# Patient Record
Sex: MALE
[De-identification: names, ages, dates, MRNs, and addresses within clinical notes are randomized per-mention and may not be internally consistent; named-entity substitution may affect disease eponyms.]

---
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
## 2019-05-25 ENCOUNTER — APPOINTMENT (OUTPATIENT)
Dept: GENERAL RADIOLOGY | Facility: CLINIC | Age: 19
End: 2019-05-25
Attending: EMERGENCY MEDICINE
Payer: MEDICAID

## 2019-05-25 ENCOUNTER — HOSPITAL ENCOUNTER (EMERGENCY)
Facility: CLINIC | Age: 19
Discharge: HOME | End: 2019-05-25
Attending: EMERGENCY MEDICINE | Admitting: EMERGENCY MEDICINE
Payer: MEDICAID

## 2019-05-25 ENCOUNTER — APPOINTMENT (OUTPATIENT)
Dept: CT IMAGING | Facility: CLINIC | Age: 19
End: 2019-05-25
Attending: EMERGENCY MEDICINE
Payer: MEDICAID

## 2019-05-25 VITALS
WEIGHT: 288 LBS | HEIGHT: 72 IN | BODY MASS INDEX: 39.01 KG/M2 | OXYGEN SATURATION: 95 % | HEART RATE: 86 BPM | DIASTOLIC BLOOD PRESSURE: 68 MMHG | SYSTOLIC BLOOD PRESSURE: 130 MMHG | RESPIRATION RATE: 18 BRPM | TEMPERATURE: 98.5 F

## 2019-05-25 DIAGNOSIS — R55 VASOVAGAL EPISODE: ICD-10-CM

## 2019-05-25 DIAGNOSIS — J11.1 INFLUENZA-LIKE ILLNESS: ICD-10-CM

## 2019-05-25 LAB
ALBUMIN SERPL-MCNC: 4.2 G/DL (ref 3.4–5)
ALBUMIN UR-MCNC: NEGATIVE MG/DL
ALP SERPL-CCNC: 79 U/L (ref 65–260)
ALT SERPL W P-5'-P-CCNC: 21 U/L (ref 0–50)
ANION GAP SERPL CALCULATED.3IONS-SCNC: 5 MMOL/L (ref 3–14)
APPEARANCE UR: CLEAR
AST SERPL W P-5'-P-CCNC: 12 U/L (ref 0–35)
BASOPHILS # BLD AUTO: 0 10E9/L (ref 0–0.2)
BASOPHILS NFR BLD AUTO: 0.3 %
BILIRUB DIRECT SERPL-MCNC: 0.3 MG/DL (ref 0–0.2)
BILIRUB SERPL-MCNC: 1.1 MG/DL (ref 0.2–1.3)
BILIRUB UR QL STRIP: NEGATIVE
BUN SERPL-MCNC: 7 MG/DL (ref 7–30)
CALCIUM SERPL-MCNC: 8.7 MG/DL (ref 8.5–10.1)
CHLORIDE SERPL-SCNC: 102 MMOL/L (ref 98–110)
CK SERPL-CCNC: 154 U/L (ref 30–300)
CO2 SERPL-SCNC: 27 MMOL/L (ref 20–32)
COLOR UR AUTO: ABNORMAL
CREAT SERPL-MCNC: 0.7 MG/DL (ref 0.5–1)
DEPRECATED S PYO AG THROAT QL EIA: NORMAL
DIFFERENTIAL METHOD BLD: ABNORMAL
EOSINOPHIL # BLD AUTO: 0 10E9/L (ref 0–0.7)
EOSINOPHIL NFR BLD AUTO: 0 %
ERYTHROCYTE [DISTWIDTH] IN BLOOD BY AUTOMATED COUNT: 13.3 % (ref 10–15)
FLUAV+FLUBV RNA SPEC QL NAA+PROBE: NEGATIVE
FLUAV+FLUBV RNA SPEC QL NAA+PROBE: NEGATIVE
GFR SERPL CREATININE-BSD FRML MDRD: >90 ML/MIN/{1.73_M2}
GLUCOSE BLDC GLUCOMTR-MCNC: 121 MG/DL (ref 70–99)
GLUCOSE SERPL-MCNC: 118 MG/DL (ref 70–99)
GLUCOSE UR STRIP-MCNC: NEGATIVE MG/DL
HCT VFR BLD AUTO: 42 % (ref 40–53)
HGB BLD-MCNC: 12.8 G/DL (ref 13.3–17.7)
HGB UR QL STRIP: ABNORMAL
IMM GRANULOCYTES # BLD: 0 10E9/L (ref 0–0.4)
IMM GRANULOCYTES NFR BLD: 0.2 %
KETONES UR STRIP-MCNC: 20 MG/DL
LEUKOCYTE ESTERASE UR QL STRIP: NEGATIVE
LYMPHOCYTES # BLD AUTO: 0.7 10E9/L (ref 0.8–5.3)
LYMPHOCYTES NFR BLD AUTO: 5.5 %
MCH RBC QN AUTO: 22.2 PG (ref 26.5–33)
MCHC RBC AUTO-ENTMCNC: 30.5 G/DL (ref 31.5–36.5)
MCV RBC AUTO: 73 FL (ref 78–100)
MONOCYTES # BLD AUTO: 1.2 10E9/L (ref 0–1.3)
MONOCYTES NFR BLD AUTO: 9.5 %
NEUTROPHILS # BLD AUTO: 10.9 10E9/L (ref 1.6–8.3)
NEUTROPHILS NFR BLD AUTO: 84.5 %
NITRATE UR QL: NEGATIVE
NRBC # BLD AUTO: 0 10*3/UL
NRBC BLD AUTO-RTO: 0 /100
PH UR STRIP: 6.5 PH (ref 5–7)
PLATELET # BLD AUTO: 377 10E9/L (ref 150–450)
POTASSIUM SERPL-SCNC: 3.2 MMOL/L (ref 3.4–5.3)
PROT SERPL-MCNC: 8.1 G/DL (ref 6.8–8.8)
RBC # BLD AUTO: 5.76 10E12/L (ref 4.4–5.9)
RBC #/AREA URNS AUTO: 5 /HPF (ref 0–2)
RSV RNA SPEC NAA+PROBE: NEGATIVE
SODIUM SERPL-SCNC: 134 MMOL/L (ref 133–144)
SOURCE: ABNORMAL
SP GR UR STRIP: 1.01 (ref 1–1.03)
SPECIMEN SOURCE: NORMAL
SPECIMEN SOURCE: NORMAL
UROBILINOGEN UR STRIP-MCNC: NORMAL MG/DL (ref 0–2)
WBC # BLD AUTO: 13 10E9/L (ref 4–11)
WBC #/AREA URNS AUTO: 2 /HPF (ref 0–5)

## 2019-05-25 PROCEDURE — 70450 CT HEAD/BRAIN W/O DYE: CPT

## 2019-05-25 PROCEDURE — 71046 X-RAY EXAM CHEST 2 VIEWS: CPT

## 2019-05-25 PROCEDURE — 25000125 ZZHC RX 250

## 2019-05-25 PROCEDURE — 80048 BASIC METABOLIC PNL TOTAL CA: CPT | Performed by: EMERGENCY MEDICINE

## 2019-05-25 PROCEDURE — 94640 AIRWAY INHALATION TREATMENT: CPT

## 2019-05-25 PROCEDURE — 96361 HYDRATE IV INFUSION ADD-ON: CPT

## 2019-05-25 PROCEDURE — 85025 COMPLETE CBC W/AUTO DIFF WBC: CPT | Performed by: EMERGENCY MEDICINE

## 2019-05-25 PROCEDURE — 87081 CULTURE SCREEN ONLY: CPT | Performed by: EMERGENCY MEDICINE

## 2019-05-25 PROCEDURE — 87631 RESP VIRUS 3-5 TARGETS: CPT | Performed by: EMERGENCY MEDICINE

## 2019-05-25 PROCEDURE — 93005 ELECTROCARDIOGRAM TRACING: CPT

## 2019-05-25 PROCEDURE — 96360 HYDRATION IV INFUSION INIT: CPT

## 2019-05-25 PROCEDURE — 80076 HEPATIC FUNCTION PANEL: CPT | Performed by: EMERGENCY MEDICINE

## 2019-05-25 PROCEDURE — 82550 ASSAY OF CK (CPK): CPT | Performed by: EMERGENCY MEDICINE

## 2019-05-25 PROCEDURE — 87804 INFLUENZA ASSAY W/OPTIC: CPT | Performed by: EMERGENCY MEDICINE

## 2019-05-25 PROCEDURE — 87880 STREP A ASSAY W/OPTIC: CPT | Performed by: EMERGENCY MEDICINE

## 2019-05-25 PROCEDURE — 99285 EMERGENCY DEPT VISIT HI MDM: CPT | Mod: 25

## 2019-05-25 PROCEDURE — 25000132 ZZH RX MED GY IP 250 OP 250 PS 637: Performed by: EMERGENCY MEDICINE

## 2019-05-25 PROCEDURE — 00000146 ZZHCL STATISTIC GLUCOSE BY METER IP

## 2019-05-25 PROCEDURE — 81001 URINALYSIS AUTO W/SCOPE: CPT | Performed by: EMERGENCY MEDICINE

## 2019-05-25 PROCEDURE — 25000128 H RX IP 250 OP 636: Performed by: EMERGENCY MEDICINE

## 2019-05-25 RX ORDER — IPRATROPIUM BROMIDE AND ALBUTEROL SULFATE 2.5; .5 MG/3ML; MG/3ML
SOLUTION RESPIRATORY (INHALATION)
Status: COMPLETED
Start: 2019-05-25 | End: 2019-05-25

## 2019-05-25 RX ORDER — IPRATROPIUM BROMIDE AND ALBUTEROL SULFATE 2.5; .5 MG/3ML; MG/3ML
3 SOLUTION RESPIRATORY (INHALATION) ONCE
Status: COMPLETED | OUTPATIENT
Start: 2019-05-25 | End: 2019-05-25

## 2019-05-25 RX ORDER — IBUPROFEN 600 MG/1
600 TABLET, FILM COATED ORAL ONCE
Status: COMPLETED | OUTPATIENT
Start: 2019-05-25 | End: 2019-05-25

## 2019-05-25 RX ORDER — IPRATROPIUM BROMIDE AND ALBUTEROL SULFATE 2.5; .5 MG/3ML; MG/3ML
3 SOLUTION RESPIRATORY (INHALATION) ONCE
Status: DISCONTINUED | OUTPATIENT
Start: 2019-05-25 | End: 2019-05-25

## 2019-05-25 RX ADMIN — IPRATROPIUM BROMIDE AND ALBUTEROL SULFATE 3 ML: .5; 3 SOLUTION RESPIRATORY (INHALATION) at 19:56

## 2019-05-25 RX ADMIN — SODIUM CHLORIDE 1000 ML: 9 INJECTION, SOLUTION INTRAVENOUS at 19:27

## 2019-05-25 RX ADMIN — IPRATROPIUM BROMIDE AND ALBUTEROL SULFATE 3 ML: 2.5; .5 SOLUTION RESPIRATORY (INHALATION) at 19:56

## 2019-05-25 RX ADMIN — IBUPROFEN 600 MG: 600 TABLET ORAL at 19:39

## 2019-05-25 ASSESSMENT — ENCOUNTER SYMPTOMS
ABDOMINAL PAIN: 1
WEAKNESS: 1
DIAPHORESIS: 1
NAUSEA: 1
CHILLS: 1
HEADACHES: 1
COUGH: 0

## 2019-05-25 ASSESSMENT — MIFFLIN-ST. JEOR: SCORE: 2359.36

## 2019-05-25 NOTE — ED NOTES
"Patient reports feeling unwell the past 2 days. States he only ate watermelon today and was feeling episodes of hot and cold. Reports chills at this time. Denies checking temperature at home. Patient was at [facility] and noted to have \"a lot of sweating\", feeling unwell, and dizzy. Patient unsure of exactly what happened at the [facility]. States he is unsure if he passed out our not. He states he was feeling unwell in the car and he closed his eyes. Patient has been taking Diatomaceous earth daily. Denies taking today.     "

## 2019-05-25 NOTE — ED PROVIDER NOTES
"  History     Chief Complaint:  Loss of Consciousness      HPI   Hakeem Huizar is a 18 year old male with history of concussions (x10) and migraines who presents to the emergency department today for evaluation of loss of consciousness. The patient reports that he is on a water fast for the past 3 days for detox, stopping marijuana and alcohol. He developed a \"banging\" headache. The patient tried to resolve the pain with benadryl and tylenol that gave no relief. He then notes that while he was at the [employer] shop he developed diaphoresis, body aches, abdominal pain, chills, and eventually had an episode of syncope. The patient also endorses nausea, leg weakness, left ear pain, and feels dehydrated. He denies any cough, congestion, or history of fainting spells.     Allergies:  Augmentin    Medications:    Tylenol   Benadryl    Past Medical History:    Mental health problem  Asthma  ADHD  Depression    Past Surgical History:    Appendectomy  Orthopedic surgery    Family History:    Father: Asthma  Sister: Asthma, depression  Mother: Anxiety disorder    Social History:  The patient was accompanied to the ED by .  Smoking Status: Never Smoker  Smokeless Tobacco: Never Used  Alcohol Use: Negative    Past 3 days  Drug Use: Negative   Marijuana: Past 3 days  PCP: Naif Lnidsay   Marital Status:  Single      Review of Systems   Constitutional: Positive for chills and diaphoresis.   HENT: Positive for ear pain. Negative for congestion.    Respiratory: Negative for cough.    Gastrointestinal: Positive for abdominal pain and nausea.   Musculoskeletal:        Positive for body aches   Neurological: Positive for syncope, weakness and headaches.   All other systems reviewed and are negative.      Physical Exam     Patient Vitals for the past 24 hrs:   BP Temp Temp src Pulse Heart Rate Resp SpO2 Height Weight   05/25/19 2258 130/68 -- -- 86 -- 18 95 % -- --   05/25/19 2249 -- 98.5  F (36.9  C) Oral -- -- -- -- -- -- "   05/25/19 2037 -- 99.9  F (37.7  C) Oral -- -- -- -- -- --   05/25/19 2022 -- -- -- -- -- -- 100 % -- --   05/25/19 2000 -- -- -- -- -- -- 100 % -- --   05/25/19 1927 -- 101.3  F (38.5  C) Oral -- -- -- -- -- --   05/25/19 1631 (!) 137/91 98.4  F (36.9  C) Temporal -- 98 20 95 % 1.829 m (6') 130.6 kg (288 lb)        Physical Exam    Vital signs and nursing notes reviewed.     Constitutional: laying on gurney appears mildly uncomfortable  HENT: Oropharynx is clear and moist, no tonsillar enlargement or exudates  Eyes: Conjunctivae are normal bilaterally. Pupils equal  Neck: normal range of motion, no meningeal signs  Cardiovascular: Normal rate, regular rhythm, normal heart sounds.   Pulmonary/Chest: Effort normal and breath sounds normal. No respiratory distress.   Abdominal: Soft. Bowel sounds are normal. No tenderness to palpation. No rebound or guarding.   Musculoskeletal: No joint swelling or edema. No muscle tightness or swelling  Neurological: Alert and oriented. No focal weakness  Skin: Skin is warm and dry. No rash noted.   Psych: normal affect    Emergency Department Course     ECG:  ECG taken at 1729, ECG read at 1732  Normal sinus rhythm  Nonspecific T wave abnormality  Abnormal ECG  Rate 90 bpm. MN interval 168 ms. QRS duration 106 ms. QT/QTc 46/423 ms. P-R-T axes 66 72 31.    Imaging:  Radiology findings were communicated with the patient who voiced understanding of the findings.    Chest XR,  PA & LAT  No acute cardiopulmonary abnormalities.    BASIL PHILLIPS MD  Reading per radiology    CT Head w/o Contrast  Normal CT scan of the head.  BASIL PHILLIPS MD  Reading per radiology    Laboratory:  Laboratory findings were communicated with the patient who voiced understanding of the findings.    UA with microscopic: Urineketon 20, blood trace, RBC/HPF 5 (H) o/w WNL  Influenza A and B and RSV PCR: negative   Beta strep group A culture: In process  Rapid strep screen (specimen throat): Negative   Glucose by  meter: 121 (H)  CBC: WBC 13.0 (H), HGB 12.8 (L),   BMP: Potassium 3.2 (L), Glucose 118 (H) o/w WNL (Creatinine 0.70)  Hepatic panel: Bilirubin 0.3 (H)  CK total: 154     Interventions:  1927 NS 1000 ml IV  1939 Ibuprofen 600 mg PO  1956 Duoneb 3 ml Nebuliation    Emergency Department Course:    1640 Nursing notes and vitals reviewed.    1641 I performed an exam of the patient as documented above.     1702 IV was inserted and blood was drawn for laboratory testing, results above.     1729 EKG obtained as noted above.     1750 The patient was sent for a CT head w/o contrast while in the emergency department, results above.      2026 The patient was sent for a chest imaging while in the emergency department, results above.      2044 The patient provided a urine sample here in the emergency department. This was sent for laboratory testing, findings above.     2236 Patient rechecked and updated.      2303 I personally reviewed the EKG, laboratory, and Imaging results with the patient and answered all related questions prior to discharge.    Impression & Plan      Medical Decision Making:  Hakeem Huizar is a 18 year old male who presents after stating he felt tired, body aches, mild headache and what sounds like a problem of  vasovagal type episode at the [employer] today. At initial arrival patient felt chill, achy all over. He had normal vital signs in initial presentation and no concerning physical findings. I did complete a CT scan since the patient notes a mild headache and his syncopal episode, these were negative. His EKG showed no acute findings. Lab test did show leukocytosis and patient was noted to spike a fever here in the ER. There were additional test were obtained. Chest X-Ray were showed no evidence of pneumonia, uranalysis showed no evidence of UTI. Strep and influenza screens were negative. After medications for his fever and IV fluids, patient said he felt significantly better, he had no no  longer had a headache or no neck stiffness or pain to suggest meningitis. His symptoms that he describes, with the myalgias, or chills, and fatigue it seems consistent with viral influenza-like illness. I discussed I cannot rule out other potential bacterial illness, but I discussed with him I suspect highly unlikely. I discussed about lumbar puncture for further testing though there is no clear evidence for meningitis either. Patient understands this and does not want to proceed with lumbar puncture and is okay with not getting one at this time. I discussed at length about his findings and discussed reasons for return like uncontrolled fever, confusions, severe headache, or unusual rash, chest pain, shortness of breath, and if these occur he should return immediately otherwise follow up with PCP. Patient understands the plan, no questions, and is discharged home.     Diagnosis:    ICD-10-CM   1. Influenza-like illness R69   2. Vasovagal episode R55     Disposition:   The patient is discharged to home.     Scribe Disclosure:  I, Dago Jeffery, am serving as a scribe at 4:50 PM on 5/25/2019 to document services personally performed by Eduardo Lombardo MD based on my observations and the provider's statements to me.     [facility] EMERGENCY DEPARTMENT       Eduardo Lombardo MD  05/27/19 9381

## 2019-05-25 NOTE — ED TRIAGE NOTES
"I was sitting in a chair and the [employer] told my friend to take me to the hospital.  Feeling dizzy, chills, sleepy and states he \"blacked out at the [employer] shop\"  Complains of headache, chills.  States he has been detoxing with salads and water.   "

## 2019-05-25 NOTE — ED AVS SNAPSHOT
[facility] Emergency Department  [address]  [facility] [address]  Phone:  [phone]  Fax:  [phone]                                    Hakeem Huizar   MRN: [id number]    Department:  [facility] Emergency Department   Date of Visit:  5/25/2019           After Visit Summary Signature Page    I have received my discharge instructions, and my questions have been answered. I have discussed any challenges I see with this plan with the nurse or doctor.    ..........................................................................................................................................  Patient/Patient Representative Signature      ..........................................................................................................................................  Patient Representative Print Name and Relationship to Patient    ..................................................               ................................................  Date                                   Time    ..........................................................................................................................................  Reviewed by Signature/Title    ...................................................              ..............................................  Date                                               Time          22EPIC Rev 08/18

## 2019-05-26 ENCOUNTER — APPOINTMENT (OUTPATIENT)
Dept: GENERAL RADIOLOGY | Facility: CLINIC | Age: 19
End: 2019-05-26
Attending: EMERGENCY MEDICINE
Payer: MEDICAID

## 2019-05-26 ENCOUNTER — HOSPITAL ENCOUNTER (EMERGENCY)
Facility: CLINIC | Age: 19
Discharge: HOME | End: 2019-05-26
Attending: EMERGENCY MEDICINE | Admitting: EMERGENCY MEDICINE
Payer: MEDICAID

## 2019-05-26 VITALS
TEMPERATURE: 102.8 F | BODY MASS INDEX: 39.33 KG/M2 | SYSTOLIC BLOOD PRESSURE: 177 MMHG | DIASTOLIC BLOOD PRESSURE: 87 MMHG | WEIGHT: 290 LBS | HEART RATE: 97 BPM | OXYGEN SATURATION: 99 % | RESPIRATION RATE: 16 BRPM

## 2019-05-26 DIAGNOSIS — K59.00 CONSTIPATION, UNSPECIFIED CONSTIPATION TYPE: ICD-10-CM

## 2019-05-26 DIAGNOSIS — B34.9 VIRAL ILLNESS: ICD-10-CM

## 2019-05-26 DIAGNOSIS — R53.83 TIREDNESS: ICD-10-CM

## 2019-05-26 LAB
INTERPRETATION ECG - MUSE: NORMAL
TSH SERPL DL<=0.005 MIU/L-ACNC: 1.21 MU/L (ref 0.4–4)

## 2019-05-26 PROCEDURE — 25000132 ZZH RX MED GY IP 250 OP 250 PS 637: Performed by: EMERGENCY MEDICINE

## 2019-05-26 PROCEDURE — 74019 RADEX ABDOMEN 2 VIEWS: CPT

## 2019-05-26 PROCEDURE — 84443 ASSAY THYROID STIM HORMONE: CPT | Performed by: EMERGENCY MEDICINE

## 2019-05-26 PROCEDURE — 25000125 ZZHC RX 250: Performed by: EMERGENCY MEDICINE

## 2019-05-26 PROCEDURE — 99285 EMERGENCY DEPT VISIT HI MDM: CPT | Mod: 25

## 2019-05-26 PROCEDURE — 25000128 H RX IP 250 OP 636: Performed by: EMERGENCY MEDICINE

## 2019-05-26 PROCEDURE — 94640 AIRWAY INHALATION TREATMENT: CPT

## 2019-05-26 PROCEDURE — 96374 THER/PROPH/DIAG INJ IV PUSH: CPT

## 2019-05-26 PROCEDURE — 96361 HYDRATE IV INFUSION ADD-ON: CPT

## 2019-05-26 PROCEDURE — 93005 ELECTROCARDIOGRAM TRACING: CPT

## 2019-05-26 RX ORDER — IBUPROFEN 600 MG/1
600 TABLET, FILM COATED ORAL ONCE
Status: COMPLETED | OUTPATIENT
Start: 2019-05-26 | End: 2019-05-26

## 2019-05-26 RX ORDER — SENNA AND DOCUSATE SODIUM 50; 8.6 MG/1; MG/1
1 TABLET, FILM COATED ORAL AT BEDTIME
Qty: 10 TABLET | Refills: 0 | Status: SHIPPED | OUTPATIENT
Start: 2019-05-26

## 2019-05-26 RX ORDER — ACETAMINOPHEN 325 MG/1
975 TABLET ORAL ONCE
Status: COMPLETED | OUTPATIENT
Start: 2019-05-26 | End: 2019-05-26

## 2019-05-26 RX ORDER — IPRATROPIUM BROMIDE AND ALBUTEROL SULFATE 2.5; .5 MG/3ML; MG/3ML
3 SOLUTION RESPIRATORY (INHALATION)
Status: DISPENSED | OUTPATIENT
Start: 2019-05-26 | End: 2019-05-26

## 2019-05-26 RX ORDER — ONDANSETRON 2 MG/ML
4 INJECTION INTRAMUSCULAR; INTRAVENOUS ONCE
Status: COMPLETED | OUTPATIENT
Start: 2019-05-26 | End: 2019-05-26

## 2019-05-26 RX ORDER — POLYETHYLENE GLYCOL 3350 17 G/17G
1 POWDER, FOR SOLUTION ORAL DAILY
Qty: 527 G | Refills: 0 | Status: SHIPPED | OUTPATIENT
Start: 2019-05-26 | End: 2019-06-25

## 2019-05-26 RX ORDER — ONDANSETRON 4 MG/1
4 TABLET, ORALLY DISINTEGRATING ORAL EVERY 8 HOURS PRN
Qty: 10 TABLET | Refills: 0 | Status: SHIPPED | OUTPATIENT
Start: 2019-05-26 | End: 2019-05-29

## 2019-05-26 RX ADMIN — IPRATROPIUM BROMIDE AND ALBUTEROL SULFATE 3 ML: .5; 3 SOLUTION RESPIRATORY (INHALATION) at 20:17

## 2019-05-26 RX ADMIN — IBUPROFEN 600 MG: 600 TABLET ORAL at 23:10

## 2019-05-26 RX ADMIN — ACETAMINOPHEN 975 MG: 325 TABLET, FILM COATED ORAL at 21:33

## 2019-05-26 RX ADMIN — SODIUM CHLORIDE 1000 ML: 9 INJECTION, SOLUTION INTRAVENOUS at 20:17

## 2019-05-26 RX ADMIN — ONDANSETRON 4 MG: 2 INJECTION INTRAMUSCULAR; INTRAVENOUS at 22:52

## 2019-05-26 RX ADMIN — IPRATROPIUM BROMIDE AND ALBUTEROL SULFATE 3 ML: .5; 3 SOLUTION RESPIRATORY (INHALATION) at 21:37

## 2019-05-26 ASSESSMENT — ENCOUNTER SYMPTOMS
CHEST TIGHTNESS: 1
FATIGUE: 1
ABDOMINAL PAIN: 1
CONSTIPATION: 1

## 2019-05-26 NOTE — ED AVS SNAPSHOT
[facility] [place] Emergency Department  [address]  [facility] [address]  Phone:  [phone]  Fax:  [phone]                                    Hakeem Huizar   MRN: [id number]    Department:  [facility] Emergency Department   Date of Visit:  5/26/2019           After Visit Summary Signature Page    I have received my discharge instructions, and my questions have been answered. I have discussed any challenges I see with this plan with the nurse or doctor.    ..........................................................................................................................................  Patient/Patient Representative Signature      ..........................................................................................................................................  Patient Representative Print Name and Relationship to Patient    ..................................................               ................................................  Date                                   Time    ..........................................................................................................................................  Reviewed by Signature/Title    ...................................................              ..............................................  Date                                               Time          22EPIC Rev 08/18

## 2019-05-27 LAB
BACTERIA SPEC CULT: NORMAL
INTERPRETATION ECG - MUSE: NORMAL
Lab: NORMAL
SPECIMEN SOURCE: NORMAL

## 2019-05-27 NOTE — ED TRIAGE NOTES
Pt c/o body aches, fatigue, lightheadedness, abdominal pain, multiple other complaints. Seen at this ED for similar symptoms yesterday. ABCs intact GCS 15

## 2019-05-27 NOTE — DISCHARGE INSTRUCTIONS
You should stop taking the Diatomaceous Earth as you have, though this is not likely causing your problems.  I suspect most likely that you have a viral illness and you should start feeling better over the next few days.  Should drink plenty of fluids and stay well-hydrated.  You can use the Zofran as needed for nausea to see if this helps with your appetite.  You can also use the stool medications as needed with the goal being having 1-2 soft formed stools a day if you are having more than this you should cut back on the stool softeners.

## 2019-05-27 NOTE — ED PROVIDER NOTES
"  History     Chief Complaint:  Fatigue      HPI   Hakeem Huizar is a 18 year old male who presents with fatigue. The patient was seen here in the emergency department yesterday 5/25 for flu-like symptoms. Here, the patient reports that he has been constipated for the last 4 days. The patient also started a new diatomaceous earth drink mix which he states benefits skin, etc., 3 days ago. The patient believes that this is what is making him feel ill. He endorses that his abdomen feels \"hard\" and also has associated chest pain and tightness similar to his asthma Denies any other pain, ability to pass gas, or recent travel. Denies fever.  Denies headache.       Allergies:  Augmentin       Medications:    The patient is not currently taking any prescribed medications.      Past Medical History:    ADHD  Asthma  Depression      Past Surgical History:    Appendectomy  Orthopedic surgery      Family History:    Asthma  Alcohol/Drug  Depression  Anxiety      Social History:  Smoking status: Never smoker  Alcohol use: No  Drug use: No  PCP: Naif Lindsay MD  Presents to the ED with mother and two sisters  Marital Status:  Single [1]       Review of Systems   Constitutional: Positive for fatigue.   Respiratory: Positive for chest tightness.    Cardiovascular: Positive for chest pain.   Gastrointestinal: Positive for abdominal pain and constipation.   All other systems reviewed and are negative.      Physical Exam     Patient Vitals for the past 24 hrs:   BP Temp Temp src Pulse Resp SpO2 Weight   05/26/19 1957 133/75 99.2  F (37.3  C) Oral 105 16 99 % 131.5 kg (290 lb)       Physical Exam  Constitutional: Alert, attentive, GCS 15.  HENT:    Nose: Nose normal.    Mouth/Throat: Oropharynx is clear, mucous membranes are moist.  Eyes: EOM are normal, anicteric, conjugate gaze  CV: regular rate and rhythm; no murmurs  Chest: Effort normal and breath sounds clear without wheezing or rales, symmetric bilaterally   GI:  non " tender. No distension. No guarding or rebound.    MSK: No LE edema, no tenderness to palpation of BLE.  Neurological:   GCS 15; A/Ox3; Cranial nerves 2-12 intact;   5/5 strength throughout the upper and lower extremities  normal fine motor coordination intact bilaterally;   normal gait   No meningismus   Skin: Skin is warm and dry.      Emergency Department Course   ECG (20:02:26):  Rate 100 bpm. NJ interval 164. QRS duration 102. QT/QTc 330/425. P-R-T axes 68 83 25. Normal sinus rhythm. Nonspecific T wave abnormality. Abnormal EKG. Agree with computer interpretation. No significant change compared to EKG dated 5/25/19 Interpreted at 2007 by Mayco Shah MD.      Imaging:  Radiographic findings were communicated with the patient who voiced understanding of the findings.    Abdomen XR, 2 Views, Flat & Upright  Mild stool burden over the left colon. No dilated  air-filled loops of bowel to suggest bowel obstruction. Visualized  lung bases appear relatively clear. The bones are unremarkable.   As read by Radiology.      Laboratory:  TSH with free T4 reflex: 1.21      Interventions:  2017: NS 1L IV Bolus  2017: 3 mL albuterol neb solution  2137: 3 mL albuterol neb solution  2133: 975 mg Tylenol PO  2252: 4 mg Zofran IV  2310: 600 mg ibuprofen PO  2310: 3 mL albuterol neb solution      Emergency Department Course:  Past medical records, nursing notes, and vitals reviewed.  1953: I performed an exam of the patient and obtained history, as documented above.    IV inserted and blood drawn.    The patient was sent for an abdomen x-ray while in the emergency department, findings above.    2211: I spoke to Poison Control regarding the patient's drink mix.    2216: I rechecked the patient. Findings and plan explained to the patient. Patient discharged home with instructions regarding supportive care, medications, and reasons to return. The importance of close follow-up was reviewed.     Impression & Plan    Medical  Decision Makin-year-old male with past medical history significant for ADHD, depression and asthma presenting for repeat evaluation of fatigue over the last 3 days.  Of note, patient was seen the day prior in the emergency department for flulike symptoms and mild headache, after negative CT imaging of his head, chest x-ray and comprehensive lab work-up including, BMP, UA, LFTs and CK, rapid strep, influenza testing were all negative.  He did have a mild leukocytosis with notable at that time to be intermittently febrile in the emergency department.  Ultimately they declined to have an LP.  Today he denies any significant headache, no significant abdominal pain but reports she has had trouble stooling for the past 3 days and has been on a water detox for which she has been taking diatomaceous earth supplement (diatoms) tox for marijuana and alcohol use.  His abdominal exam is benign, I see no indication advanced imaging but I did elect to get a two-view abdominal x-ray to look for stool burden and obstruction which showed only mild amount of stool burden.  He denies any headaches today, I have low suspicion for meningitis at this time and lumbar puncture was deferred.  His symptom constellation is most suggestive of likely viral etiology including some fatigue, malaise and he was noted again to spike a fever here while in the emergency department and had one episode of nonbloody nonbilious emesis but he is otherwise well-appearing amatory in the ED with a steady gait.  He does not meet sepsis criteria, further labs were deferred though I did like to check a TSH was within normal limits. He was given several nebulizers with improvement in his chest tightness though he had no wheezing or decreased air movement on exam. EKG without ischemic changes.  In discussion with the patient and his mother, we agreed for discharge home with a prescription for Zofran, stool regimen to see if this helps him have a bowel  movement and ease his abdominal fullness return precautions were reviewed including worsening abdominal pain, persistent fevers or change in symptoms.  Patient and mother expressed understand this plan he was discharged home.  Of note I did discuss his supplement with [place] [facility] for his use of diatomaceous earth, of which they feel is not likely contributing.     Diagnosis:    ICD-10-CM   1. Constipation, unspecified constipation type K59.00   2. Tiredness R53.83   3. Viral illenss    Disposition: Discharged to home    Discharge Medications:  Medication List      Started    ondansetron 4 MG ODT tab  Commonly known as:  ZOFRAN ODT  4 mg, Oral, EVERY 8 HOURS PRN     polyethylene glycol powder  Commonly known as:  MIRALAX  1 capful, Oral, DAILY     SENNA-docusate sodium 8.6-50 MG tablet  Commonly known as:  SENNA S  1 tablet, Oral, AT BEDTIME       Mayco Shah MD   [employer]  2:43 AM 05/27/19     Isabel TORRES, am serving as a scribe at 7:53 PM on 5/26/2019 to document services personally performed by Mayco Shah MD based on my observations and the provider's statements to me.     Isabel Oliver  5/26/2019   [facility] EMERGENCY DEPARTMENT       Mayco Shah MD  05/27/19 0244

## 2022-03-17 ENCOUNTER — HOSPITAL ENCOUNTER (EMERGENCY)
Facility: CLINIC | Age: 22
End: 2022-03-17

## 2023-11-10 ENCOUNTER — APPOINTMENT (OUTPATIENT)
Dept: GENERAL RADIOLOGY | Facility: CLINIC | Age: 23
End: 2023-11-10
Attending: EMERGENCY MEDICINE
Payer: COMMERCIAL

## 2023-11-10 ENCOUNTER — HOSPITAL ENCOUNTER (EMERGENCY)
Facility: CLINIC | Age: 23
Discharge: HOME | End: 2023-11-10
Attending: EMERGENCY MEDICINE | Admitting: EMERGENCY MEDICINE
Payer: COMMERCIAL

## 2023-11-10 VITALS
RESPIRATION RATE: 18 BRPM | TEMPERATURE: 98 F | WEIGHT: 275 LBS | DIASTOLIC BLOOD PRESSURE: 88 MMHG | SYSTOLIC BLOOD PRESSURE: 138 MMHG | OXYGEN SATURATION: 97 % | BODY MASS INDEX: 37.3 KG/M2 | HEART RATE: 84 BPM

## 2023-11-10 DIAGNOSIS — S82.851A TRIMALLEOLAR FRACTURE, RIGHT, CLOSED, INITIAL ENCOUNTER: ICD-10-CM

## 2023-11-10 PROCEDURE — 250N000013 HC RX MED GY IP 250 OP 250 PS 637: Performed by: EMERGENCY MEDICINE

## 2023-11-10 PROCEDURE — 29515 APPLICATION SHORT LEG SPLINT: CPT | Mod: RT

## 2023-11-10 PROCEDURE — 73610 X-RAY EXAM OF ANKLE: CPT | Mod: RT

## 2023-11-10 PROCEDURE — 99284 EMERGENCY DEPT VISIT MOD MDM: CPT | Mod: 25

## 2023-11-10 RX ORDER — IBUPROFEN 800 MG/1
800 TABLET, FILM COATED ORAL EVERY 8 HOURS PRN
Qty: 30 TABLET | Refills: 0 | Status: SHIPPED | OUTPATIENT
Start: 2023-11-10

## 2023-11-10 RX ORDER — OXYCODONE HYDROCHLORIDE 5 MG/1
5 TABLET ORAL ONCE
Status: COMPLETED | OUTPATIENT
Start: 2023-11-10 | End: 2023-11-10

## 2023-11-10 RX ORDER — IBUPROFEN 800 MG/1
800 TABLET, FILM COATED ORAL ONCE
Status: COMPLETED | OUTPATIENT
Start: 2023-11-10 | End: 2023-11-10

## 2023-11-10 RX ORDER — ACETAMINOPHEN 325 MG/1
650 TABLET ORAL ONCE
Status: COMPLETED | OUTPATIENT
Start: 2023-11-10 | End: 2023-11-10

## 2023-11-10 RX ORDER — IBUPROFEN 200 MG
400 TABLET ORAL ONCE
Status: COMPLETED | OUTPATIENT
Start: 2023-11-10 | End: 2023-11-10

## 2023-11-10 RX ORDER — OXYCODONE HYDROCHLORIDE 5 MG/1
5 TABLET ORAL EVERY 6 HOURS PRN
Qty: 14 TABLET | Refills: 0 | Status: SHIPPED | OUTPATIENT
Start: 2023-11-10

## 2023-11-10 RX ADMIN — IBUPROFEN 800 MG: 800 TABLET ORAL at 14:44

## 2023-11-10 RX ADMIN — OXYCODONE HYDROCHLORIDE 5 MG: 5 TABLET ORAL at 14:44

## 2023-11-10 RX ADMIN — ACETAMINOPHEN 650 MG: 325 TABLET, FILM COATED ORAL at 11:02

## 2023-11-10 NOTE — ED NOTES
Pt discharged with written instructions with SO.  Pt was given crutches and instructions on use.  ED tech took him on a test walk.  Pt was taken to the ED lobby via wheelchair by SO.  Pt and SO verbalize understanding of calling [facility] tonight or tomorrow to set up surgery.  No further questions at this time.

## 2023-11-10 NOTE — DISCHARGE INSTRUCTIONS
Follow-up with orthopedic surgery next week to discuss surgery. This fracture typically needs surgery to heal.     Take ibuprofen 800mg every 8 hours to help control pain. Use oxycodone 5mg every 6 hours as needed for breakthrough pain.     Ice ankle and keep it elevated.     Don't put any weight on your ankle, walk only with your crutches.     Return to emergency department for worsening/uncontrollable pain, fever > 100.4, cold/ blue toes, or for any other concerns.

## 2023-11-10 NOTE — ED TRIAGE NOTES
Pt arrives with c/o right ankle pain. Pt reports he jumped off a 1st floor balcony last night and landed on his feet. He had pain to the left heel but his right leg slipped in the grass and he twisted his ankle. Small abrasion noted to ankle, unknown if this was caused by protruding bone or not per pt. CMS intact.      Triage Assessment (Adult)       Row Name 11/10/23 105          Triage Assessment    Airway WDL WDL        Respiratory WDL    Respiratory WDL WDL        Skin Circulation/Temperature WDL    Skin Circulation/Temperature WDL WDL        Cardiac WDL    Cardiac WDL WDL        Peripheral/Neurovascular WDL    Peripheral Neurovascular WDL WDL        Cognitive/Neuro/Behavioral WDL    Cognitive/Neuro/Behavioral WDL WDL

## 2023-11-11 NOTE — ED PROVIDER NOTES
History     Chief Complaint:  Ankle Pain       HPI   Hakeem Huizar is a 23 year old male who presents with chief complaint right ankle pain.  Very early this morning at about 3 AM, patient jumped off a balcony.  He reports immediate pain to his right ankle, and was unable to walk.  He denies any numbness or tingling.  He slept, and then presents to the emergency department today for further evaluation and treatment.      Independent Historian:   None - Patient Only    Review of External Notes:   none      Medications:    ibuprofen        Past Medical History:    Past Medical History:   Diagnosis Date    ADHD (attention deficit hyperactivity disorder)     Asthma     Depression     Depression        Past Surgical History:    Past Surgical History:   Procedure Laterality Date    APPENDECTOMY      ORTHOPEDIC SURGERY          Physical Exam   Patient Vitals for the past 24 hrs:   BP Temp Temp src Pulse Resp SpO2 Weight   11/10/23 1622 -- -- -- -- -- 97 % --   11/10/23 1621 138/88 -- -- 84 -- -- --   11/10/23 1059 (!) 137/90 98  F (36.7  C) Temporal 88 18 97 % 124.7 kg (275 lb)        Physical Exam    Head:  The scalp, face, and head appear normal  Eyes:  Conjunctivae are normal  ENT:    The nose is normal    Pinnae are normal  Neck:  Trachea midline  CV:  Normal rate, regular rhythm. DP pulse normal.   Resp:  No respiratory distress   Musc:  right ankle swollen. Painful to palpation. Pain/swelling limits ROM.   Skin:   there is a small abrasion overlying the medial malleolus without evidence of open fracture or penetration into the subcutaneous tissues.  Neuro:  Speech is normal and fluent. Face is symmetric. Moving all extremities well.  Patient able to wiggle all toes.  Has normal sensation in all 5 distal nerve distributions.      Emergency Department Course       Imaging:  XR Ankle Right G/E 3 Views   Final Result   IMPRESSION:   Trimalleolar fracture/injury.       There is asymmetric widening of the medial clear  space with multiple   tiny fracture fragments beneath the tip of the medial malleolus, acute   in appearance. Additionally there is an oblique minimally displaced   fracture of the lateral malleolus with adjacent soft tissue swelling.   Mildly displaced and distracted posterior malleolus fracture with   small adjacent bone fragments.      NOTE: ABNORMAL REPORT      THE DICTATION ABOVE DESCRIBES AN ABNORMAL REPORT FOR WHICH FOLLOW-UP   IS NEEDED.      BLE BARBOZA MD            SYSTEM ID:  [id number]             Procedures    Splint Procedure Note:    Splint type: Short leg posterior with stirrups  Material: plaster  Location: right ankle  Indication: Fracture    Performed by: Eduardo Maxwell MD    Procedure: Cast padding applied to skin with particular attention applied to bony prominences, splint applied in usual fashion.  Post splint sensation, motor, cap refill intact.  Potential complications from splinting were discussed with patient including signs splint is too tight causing compartment syndrome or ischemia including: persistent uncontrolled pain, sensory changes/loss, discoloration of distal portions of the affected extremity.  The patient verbalized understanding and agreement.        Emergency Department Course & Assessments:             Interventions:  Medications   acetaminophen (TYLENOL) tablet 650 mg (650 mg Oral $Given 11/10/23 1102)     Or   ibuprofen (ADVIL/MOTRIN) tablet 400 mg ( Oral See Alternative 11/10/23 1102)   oxyCODONE (ROXICODONE) tablet 5 mg (5 mg Oral $Given 11/10/23 1444)   ibuprofen (ADVIL/MOTRIN) tablet 800 mg (800 mg Oral $Given 11/10/23 1444)          Independent Interpretation (X-rays, CTs, rhythm strip):  I independently reviewed the ankle x-ray.  Patient has evidence of a currently nondisplaced trimalleolar fracture.      Consultations/Discussion of Management or Tests:     The patient arrived in triage where vitals were measured and recorded.   The patient was then escorted  back to the emergency department.   The patient's medical records were reviewed.  Nursing notes and vitals were reviewed.    I performed an exam of the patient as documented above. The patient is in agreement with my plan of care.       Social Determinants of Health affecting care:   None    Disposition:  The patient was discharged to home.     Impression & Plan        Medical Decision Making:  Patient presents with chief complaint right ankle pain after jumping off the balcony last night.  X-rays show a trimalleolar fracture.  Discussed with patient that this is going to need operative repair.  Patient was noted to have a small wound overlying the medial malleolus.  This appears to be an abrasion and does not penetrate through the dermis.  I do not believe it is an open fracture.  There are no sharp fragments on the x-ray to suggest that it is either.  I did discuss this with orthopedic surgery PALesly, who also discussed with Dr. Fuller from [facility].  I did also share a picture of the small wound that we believe is an abrasion.  They recommended splinting and [facility] follow-up next week.  Discussed this with patient and his partner at bedside and they voiced understanding.  Patient was splinted as above and instructed to be nonweightbearing until follow-up.  He was provided with crutches as well as education.  We discussed signs/symptoms of compartment syndrome that would merit emergency department return.  We also discussed returning for fever, uncontrollable pain, or for any other concerns.       Diagnosis:    ICD-10-CM    1. Trimalleolar fracture, right, closed, initial encounter  S82.851A            Discharge Medications:  Discharge Medication List as of 11/10/2023  4:16 PM        START taking these medications    Details   !! ibuprofen (ADVIL/MOTRIN) 800 MG tablet Take 1 tablet (800 mg) by mouth every 8 hours as needed for moderate pain, Disp-30 tablet, R-0, E-Prescribe      oxyCODONE (ROXICODONE) 5 MG tablet  Take 1 tablet (5 mg) by mouth every 6 hours as needed for severe pain, Disp-14 tablet, R-0, E-Prescribe       !! - Potential duplicate medications found. Please discuss with provider.              Eduardo Maxwell MD  11/10/23 5260

## 2023-11-12 ENCOUNTER — NURSE TRIAGE (OUTPATIENT)
Dept: NURSING | Facility: CLINIC | Age: 23
End: 2023-11-12
Payer: COMMERCIAL

## 2023-11-12 ENCOUNTER — HOSPITAL ENCOUNTER (EMERGENCY)
Facility: CLINIC | Age: 23
Discharge: HOME | End: 2023-11-12
Attending: EMERGENCY MEDICINE | Admitting: EMERGENCY MEDICINE
Payer: COMMERCIAL

## 2023-11-12 VITALS
RESPIRATION RATE: 16 BRPM | SYSTOLIC BLOOD PRESSURE: 171 MMHG | HEART RATE: 89 BPM | OXYGEN SATURATION: 100 % | TEMPERATURE: 97.9 F | DIASTOLIC BLOOD PRESSURE: 93 MMHG

## 2023-11-12 DIAGNOSIS — S82.851A CLOSED TRIMALLEOLAR FRACTURE OF RIGHT ANKLE, INITIAL ENCOUNTER: ICD-10-CM

## 2023-11-12 PROCEDURE — 99283 EMERGENCY DEPT VISIT LOW MDM: CPT

## 2023-11-12 RX ORDER — OXYCODONE HYDROCHLORIDE 5 MG/1
5 TABLET ORAL EVERY 6 HOURS PRN
Qty: 6 TABLET | Refills: 0 | Status: SHIPPED | OUTPATIENT
Start: 2023-11-12 | End: 2023-11-15

## 2023-11-12 ASSESSMENT — ACTIVITIES OF DAILY LIVING (ADL): ADLS_ACUITY_SCORE: 33

## 2023-11-12 NOTE — TELEPHONE ENCOUNTER
Patient is calling and states that he has a lot of pain in his foot and broke it the other day and is taking ibuprofen and Oxycodone.  Patient is out of pain medication.  Patient was into ED on 11/10/2023 with Trimalleolar fracture, right closed.   Patient is requesting pain medication.  FNA advised that pain medication cannot be prescribed during the weekend and patient agrees.  Patient states that he is going to return to ED due to severe pain.    Reason for Disposition   [1] Caller has NON-URGENT medicine question about med that PCP prescribed AND [2] triager unable to answer question    Additional Information   Negative: [1] Intentional drug overdose AND [2] suicidal thoughts or ideas   Negative: MORE THAN A DOUBLE DOSE of a prescription or over-the-counter (OTC) drug   Negative: [1] DOUBLE DOSE (an extra dose or lesser amount) of prescription drug AND [2] any symptoms (e.g., dizziness, nausea, pain, sleepiness)   Negative: [1] DOUBLE DOSE (an extra dose or lesser amount) of over-the-counter (OTC) drug AND [2] any symptoms (e.g., dizziness, nausea, pain, sleepiness)   Negative: Took another person's prescription drug   Negative: [1] DOUBLE DOSE (an extra dose or lesser amount) of prescription drug AND [2] NO symptoms  (Exception: A double dose of antibiotics.)   Negative: Diabetes drug error or overdose (e.g., took wrong type of insulin or took extra dose)   Negative: [1] Prescription not at pharmacy AND [2] was prescribed by PCP recently (Exception: Triager has access to EMR and prescription is recorded there. Go to Home Care and confirm for pharmacy.)   Negative: [1] Pharmacy calling with prescription question AND [2] triager unable to answer question   Negative: [1] Caller has URGENT medicine question about med that PCP or specialist prescribed AND [2] triager unable to answer question   Negative: Medicine patch causing local rash or itching   Negative: [1] Caller has medicine question about med NOT  prescribed by PCP AND [2] triager unable to answer question (e.g., compatibility with other med, storage)   Negative: Prescription request for new medicine (not a refill)    Protocols used: Medication Question Call-A-AH

## 2023-11-13 NOTE — ED PROVIDER NOTES
"  History     Chief Complaint:  Medication Refill    HPI   Hakeem Huizar is a 23 year old male who presents with a need for a medication refill. Patient was given oxycodone but states that it has not been working well for him; states his significant other has been timing his doses, and he is taking his ibuprofen on time but takes his oxycodone too early due to severity of pain. Reports he last took two oxycodone around an hour ago and states his pain is a mild \"throb\" right now. He has set up an appointment with [facility] for surgery in 4 days.    Independent Historian:   None - Patient Only    Review of External Notes:   ED encounter note from 11/10/2023 reviewed when the patient was seen for the above mentioned trimalleolar ankle fracture.    Medications:    Oxycodone  Senna-docusate    Past Medical History:    ADHD  Asthma  Depression     Past Surgical History:    Appendectomy   Orthopedic surgery     Physical Exam   Patient Vitals for the past 24 hrs:   BP Temp Temp src Pulse Resp SpO2   11/12/23 1827 (!) 171/93 97.9  F (36.6  C) Temporal 89 16 100 %        Physical Exam  General: Laying on the ED bed  HEENT: Normocephalic, atraumatic  Cardiac: Warm and well perfused  Pulm: Breathing comfortably, no accessory muscle usage, no conversational dyspnea  MSK: Splint in place to the right leg.  Warm and well perfused to the right foot toes and sensory intact of the right foot toes.  Skin: Warm and dry  Neuro: Moves all extremities  Psych: Pleasant mood and affect      Emergency Department Course     Emergency Department Course & Assessments:       Interventions:  Medications - No data to display     Assessments:  1920 I entered the patient's room and obtained history. I believe that they are safe for discharge at this time.    Independent Interpretation (X-rays, CTs, rhythm strip):  None    Consultations/Discussion of Management or Tests:  None        Social Determinants of Health affecting care: "   None    Disposition:  The patient was discharged to home.     Impression & Plan    CMS Diagnoses: None    Medical Decision Makin-year-old male presents with pain from an established trimalleolar ankle fracture that occurred on 2023, seen in the ED on 11/10/2023.  No signs of compartment syndrome on exam.  The patient has evidently been taking the oxycodone more often than prescribed.  He does have an acute injury and the reason for his pain.  I counseled the patient on using acetaminophen and ibuprofen is much as possible for his base pain control and oxycodone for breakthrough.  We also discussed the propensity for addiction and abuse of opiates.  The patient expressed understanding.  He has established follow-up as recommended in 4 days.  As such, I will provide 1 more short extension of his opiate pain medication prescription to help him get to Thursday.  I did offer to call orthopedics and inquire regarding sooner scheduling, however the patient is satisfied with the plan above and we will defer that for now.  Patient discharged home, also wrote a DME prescription for a knee scooter at the request of the patient.      Diagnosis:    ICD-10-CM    1. Closed trimalleolar fracture of right ankle, initial encounter  S82.851A Rolling Knee Walker/Scooter Order for DME - ONLY FOR DME           Discharge Medications:  Discharge Medication List as of 2023  7:28 PM        START taking these medications    Details   !! oxyCODONE (ROXICODONE) 5 MG tablet Take 1 tablet (5 mg) by mouth every 6 hours as needed for severe pain, Disp-6 tablet, R-0, E-Prescribe       !! - Potential duplicate medications found. Please discuss with provider.        Scribe Disclosure:  Isauro TORRES Hired, am serving as a scribe at 6:56 PM on 2023 to document services personally performed by Sanket Barton MD based on my observations and the provider's statements to me.   2023   Sanket Barton MD King, Colin, MD  23  2030
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
## 2024-09-05 ENCOUNTER — APPOINTMENT (OUTPATIENT)
Dept: GENERAL RADIOLOGY | Facility: CLINIC | Age: 24
End: 2024-09-05
Attending: EMERGENCY MEDICINE
Payer: COMMERCIAL

## 2024-09-05 ENCOUNTER — HOSPITAL ENCOUNTER (EMERGENCY)
Facility: CLINIC | Age: 24
Discharge: HOME | End: 2024-09-05
Attending: EMERGENCY MEDICINE | Admitting: EMERGENCY MEDICINE
Payer: COMMERCIAL

## 2024-09-05 VITALS
HEART RATE: 77 BPM | OXYGEN SATURATION: 100 % | HEIGHT: 74 IN | SYSTOLIC BLOOD PRESSURE: 152 MMHG | WEIGHT: 276.46 LBS | TEMPERATURE: 98.2 F | DIASTOLIC BLOOD PRESSURE: 66 MMHG | RESPIRATION RATE: 18 BRPM | BODY MASS INDEX: 35.48 KG/M2

## 2024-09-05 DIAGNOSIS — S90.01XA CONTUSION OF RIGHT ANKLE, INITIAL ENCOUNTER: ICD-10-CM

## 2024-09-05 DIAGNOSIS — S20.211A RIB CONTUSION, RIGHT, INITIAL ENCOUNTER: ICD-10-CM

## 2024-09-05 DIAGNOSIS — S09.90XA CLOSED HEAD INJURY, INITIAL ENCOUNTER: ICD-10-CM
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
## 2024-09-05 PROCEDURE — 73610 X-RAY EXAM OF ANKLE: CPT | Mod: RT

## 2024-09-05 PROCEDURE — 99284 EMERGENCY DEPT VISIT MOD MDM: CPT

## 2024-09-05 PROCEDURE — 71101 X-RAY EXAM UNILAT RIBS/CHEST: CPT | Mod: RT

## 2024-09-05 ASSESSMENT — COLUMBIA-SUICIDE SEVERITY RATING SCALE - C-SSRS
1. IN THE PAST MONTH, HAVE YOU WISHED YOU WERE DEAD OR WISHED YOU COULD GO TO SLEEP AND NOT WAKE UP?: NO
6. HAVE YOU EVER DONE ANYTHING, STARTED TO DO ANYTHING, OR PREPARED TO DO ANYTHING TO END YOUR LIFE?: NO
2. HAVE YOU ACTUALLY HAD ANY THOUGHTS OF KILLING YOURSELF IN THE PAST MONTH?: NO

## 2024-09-05 ASSESSMENT — ACTIVITIES OF DAILY LIVING (ADL): ADLS_ACUITY_SCORE: 33

## 2024-09-06 NOTE — DISCHARGE INSTRUCTIONS
Discharge Instructions  Head Injury    You have been seen today for a head injury. Your evaluation included a history and physical examination. You may have had a CT (CAT) scan performed, though most head injuries do not require a scan. Based on this evaluation, your provider today does not feel that your head injury is serious.    Generally, every Emergency Department visit should have a follow-up clinic visit with either a primary or a specialty clinic/provider. Please follow-up as instructed by your emergency provider today.  Return to the Emergency Department if:  You are confused or you are not acting right.  Your headache gets worse or you start to have a really bad headache even with your recommended treatment plan.  You vomit (throw up) more than once.  You have a seizure.  You have trouble walking.  You have weakness or paralysis (cannot move) in an arm or a leg.  You have blood or fluid coming from your ears or nose.  You have new symptoms or anything that worries you.    Sleeping:  It is okay for you to sleep, but someone should wake you up if instructed by your provider, and someone should check on you at your usual time to wake up.     Activity:  Do not drive for at least 24 hours.  Do not drive if you have dizzy spells or trouble concentrating, or remembering things.  Do not return to any contact sports until cleared by your regular provider.     MORE INFORMATION:    Concussion:  A concussion is a minor head injury that may cause temporary problems with the way the brain works. Although concussions are important, they are generally not an emergency or a reason that a person needs to be hospitalized. Some concussion symptoms include confusion, amnesia (forgetful), nausea (sick to your stomach) and vomiting (throwing up), dizziness, fatigue, memory or concentration problems, irritability and sleep problems. For most people, concussions are mild and temporary but some will have more severe and persistent  symptoms that require on-going care and treatment.  CT Scans: Your evaluation today may have included a CT scan (CAT scan) to look for things like bleeding or a skull fracture (broken bone).  CT scans involve radiation and too many CT scans can cause serious health problems like cancer, especially in children.  Because of this, your provider may not have ordered a CT scan today if they think you are at low risk for a serious or life threatening problem.    Blood Thinners. If you take blood thinners, there is a very small risk of delayed bleeding after your head injury. In the days/weeks to come, watch for the symptoms described above particularly headaches, confusion, problems walking, and weakness in an arm or leg.    If you were given a prescription for medicine here today, be sure to read all of the information (including the package insert) that comes with your prescription.  This will include important information about the medicine, its side effects, and any warnings that you need to know about.  The pharmacist who fills the prescription can provide more information and answer questions you may have about the medicine.  If you have questions or concerns that the pharmacist cannot address, please call or return to the Emergency Department.     Remember that you can always come back to the Emergency Department if you are not able to see your regular provider in the amount of time listed above, if you get any new symptoms, or if there is anything that worries you.
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
## 2024-09-08 ENCOUNTER — APPOINTMENT (OUTPATIENT)
Dept: CT IMAGING | Facility: CLINIC | Age: 24
End: 2024-09-08
Attending: EMERGENCY MEDICINE
Payer: COMMERCIAL

## 2024-09-08 ENCOUNTER — HOSPITAL ENCOUNTER (EMERGENCY)
Facility: CLINIC | Age: 24
Discharge: HOME | End: 2024-09-08
Attending: EMERGENCY MEDICINE | Admitting: EMERGENCY MEDICINE
Payer: COMMERCIAL

## 2024-09-08 VITALS
WEIGHT: 268.74 LBS | HEART RATE: 73 BPM | SYSTOLIC BLOOD PRESSURE: 159 MMHG | TEMPERATURE: 97.8 F | RESPIRATION RATE: 18 BRPM | HEIGHT: 74 IN | OXYGEN SATURATION: 100 % | BODY MASS INDEX: 34.49 KG/M2 | DIASTOLIC BLOOD PRESSURE: 100 MMHG

## 2024-09-08 DIAGNOSIS — K04.7 APICAL ABSCESS: ICD-10-CM

## 2024-09-08 DIAGNOSIS — S00.83XA FACIAL CONTUSION, INITIAL ENCOUNTER: ICD-10-CM

## 2024-09-08 PROCEDURE — 70450 CT HEAD/BRAIN W/O DYE: CPT

## 2024-09-08 PROCEDURE — 70487 CT MAXILLOFACIAL W/DYE: CPT

## 2024-09-08 PROCEDURE — 99285 EMERGENCY DEPT VISIT HI MDM: CPT | Mod: 25

## 2024-09-08 PROCEDURE — 250N000011 HC RX IP 250 OP 636: Performed by: EMERGENCY MEDICINE

## 2024-09-08 RX ORDER — IOPAMIDOL 755 MG/ML
67 INJECTION, SOLUTION INTRAVASCULAR ONCE
Status: COMPLETED | OUTPATIENT
Start: 2024-09-08 | End: 2024-09-08

## 2024-09-08 RX ORDER — CLINDAMYCIN HCL 150 MG
450 CAPSULE ORAL 3 TIMES DAILY
Qty: 63 CAPSULE | Refills: 0 | Status: SHIPPED | OUTPATIENT
Start: 2024-09-08 | End: 2024-09-15

## 2024-09-08 RX ORDER — IOPAMIDOL 755 MG/ML
500 INJECTION, SOLUTION INTRAVASCULAR ONCE
Status: DISCONTINUED | OUTPATIENT
Start: 2024-09-08 | End: 2024-09-08

## 2024-09-08 RX ADMIN — IOPAMIDOL 67 ML: 755 INJECTION, SOLUTION INTRAVENOUS at 12:00

## 2024-09-08 ASSESSMENT — ACTIVITIES OF DAILY LIVING (ADL)
ADLS_ACUITY_SCORE: 35
ADLS_ACUITY_SCORE: 35
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
## 2025-06-01 ENCOUNTER — OFFICE VISIT (OUTPATIENT)
Dept: URGENT CARE | Facility: URGENT CARE | Age: 25
End: 2025-06-01
Payer: COMMERCIAL

## 2025-06-01 VITALS
DIASTOLIC BLOOD PRESSURE: 77 MMHG | HEIGHT: 74 IN | BODY MASS INDEX: 35.29 KG/M2 | TEMPERATURE: 98.4 F | OXYGEN SATURATION: 97 % | SYSTOLIC BLOOD PRESSURE: 139 MMHG | WEIGHT: 275 LBS | HEART RATE: 71 BPM | RESPIRATION RATE: 18 BRPM

## 2025-06-01 DIAGNOSIS — N48.89 SWELLING OF PENIS: Primary | ICD-10-CM

## 2025-06-01 PROCEDURE — 99203 OFFICE O/P NEW LOW 30 MIN: CPT | Performed by: NURSE PRACTITIONER

## 2025-06-01 PROCEDURE — 87491 CHLMYD TRACH DNA AMP PROBE: CPT | Performed by: NURSE PRACTITIONER

## 2025-06-01 PROCEDURE — 3075F SYST BP GE 130 - 139MM HG: CPT | Performed by: NURSE PRACTITIONER

## 2025-06-01 PROCEDURE — 3078F DIAST BP <80 MM HG: CPT | Performed by: NURSE PRACTITIONER

## 2025-06-01 PROCEDURE — 87591 N.GONORRHOEAE DNA AMP PROB: CPT | Performed by: NURSE PRACTITIONER

## 2025-06-01 NOTE — PROGRESS NOTES
"Urgent Care Clinic Visit    Chief Complaint   Patient presents with    Urgent Care     Pt presents with swelling of his penis onset Friday.               6/1/2025    12:39 PM   Additional Questions   Roomed by john helm   Accompanied by n/a       Assessment & Plan     Swelling of penis    - NEISSERIA GONORRHOEA PCR  - CHLAMYDIA TRACHOMATIS PCR       Patient Instructions   Chlamydia and gonorrhea pending.        Return in about 2 days (around 6/3/2025) for with regular provider if symptoms persist.    [facility] FREDA Palacio is a 24 year old male who presents to clinic today for the following health issues:  Chief Complaint   Patient presents with    Urgent Care     Pt presents with swelling of his penis onset Friday.         6/1/2025    12:39 PM   Additional Questions   Roomed by john helm   Accompanied by n/a     HPI    UTI    Onset of symptoms was 1day(s).  Course of illness is worsening  Severity moderate  Current and associated symptoms dysuria, frequency, urgency, puss in urine, and swelling of the penis  Treatment and measures tried None  Predisposing factors include none  Patient denies rigors, flank pain, and temperature > 101 degrees F.        Review of Systems  Constitutional, HEENT, cardiovascular, pulmonary, GI, , musculoskeletal, neuro, skin, endocrine and psych systems are negative, except as otherwise noted.      Objective    /77   Pulse 71   Temp 98.4  F (36.9  C) (Oral)   Resp 18   Ht 1.88 m (6' 2\")   Wt 124.7 kg (275 lb)   SpO2 97%   BMI 35.31 kg/m    Physical Exam   GENERAL: alert and no distress  RESP: lungs clear to auscultation - no rales, rhonchi or wheezes  CV: regular rate and rhythm, normal S1 S2, no S3 or S4, no murmur, click or rub, no peripheral edema  MS: no gross musculoskeletal defects noted, no edema  BACK: no CVA tenderness, no paralumbar tenderness            "

## 2025-06-02 ENCOUNTER — RESULTS FOLLOW-UP (OUTPATIENT)
Dept: URGENT CARE | Facility: URGENT CARE | Age: 25
End: 2025-06-02

## 2025-06-02 LAB
C TRACH DNA SPEC QL NAA+PROBE: POSITIVE
N GONORRHOEA DNA SPEC QL NAA+PROBE: POSITIVE
SPECIMEN TYPE: ABNORMAL
SPECIMEN TYPE: ABNORMAL